# Patient Record
(demographics unavailable — no encounter records)

---

## 2025-02-07 NOTE — HISTORY OF PRESENT ILLNESS
[Pain is well-controlled] : pain is well-controlled [Clean/Dry/Intact] : clean, dry and intact [None] : no vaginal bleeding [Normal] : normal [Pathology reviewed] : pathology reviewed [Fever] : no fever [Chills] : no chills [Nausea] : no nausea [Vomiting] : no vomiting [Diarrhea] : no diarrhea [Vaginal Bleeding] : no vaginal bleeding [Pelvic Pressure] : no pelvic pressure [Dysuria] : no dysuria [Vaginal Discharge] : no vaginal discharge [Erythema] : not erythematous [Swelling] : not swollen [Dehiscence] : not dehisced [Healed] : not healed [Hematoma] : no vaginal hematoma [Abscess Formation] : no vaginal abscess [de-identified] : 14 [de-identified] : TLH, BS, Cysto, Nexplanon Removal [de-identified] : AUB, suspected adenomyosis [de-identified] : 41 yo  POD#14 s/p TLH, BS, Cysto, Nexplanon removal. Patient is doing well. Ambulating, tolerating PO, voiding, pain well controlled, having regular bowel movements.   Path reviewed: benign, consistent with adenomyosis

## 2025-02-07 NOTE — ASSESSMENT
[Doing Well] : is doing well [Excellent Pain Control] : has excellent pain control [No Sign of Infection] : is showing no signs of infection [Regressing] : is regressing [de-identified] : 41 yo  POD#14 s/p TLH, BS, Cysto, Nexplanon removal.

## 2025-02-07 NOTE — HISTORY OF PRESENT ILLNESS
[Pain is well-controlled] : pain is well-controlled [Clean/Dry/Intact] : clean, dry and intact [None] : no vaginal bleeding [Normal] : normal [Pathology reviewed] : pathology reviewed [Fever] : no fever [Chills] : no chills [Nausea] : no nausea [Vomiting] : no vomiting [Diarrhea] : no diarrhea [Vaginal Bleeding] : no vaginal bleeding [Pelvic Pressure] : no pelvic pressure [Dysuria] : no dysuria [Vaginal Discharge] : no vaginal discharge [Erythema] : not erythematous [Swelling] : not swollen [Dehiscence] : not dehisced [Healed] : not healed [Hematoma] : no vaginal hematoma [Abscess Formation] : no vaginal abscess [de-identified] : 14 [de-identified] : TLH, BS, Cysto, Nexplanon Removal [de-identified] : AUB, suspected adenomyosis [de-identified] : 41 yo  POD#14 s/p TLH, BS, Cysto, Nexplanon removal. Patient is doing well. Ambulating, tolerating PO, voiding, pain well controlled, having regular bowel movements.   Path reviewed: benign, consistent with adenomyosis

## 2025-02-07 NOTE — ASSESSMENT
[Doing Well] : is doing well [Excellent Pain Control] : has excellent pain control [No Sign of Infection] : is showing no signs of infection [Regressing] : is regressing [de-identified] : 41 yo  POD#14 s/p TLH, BS, Cysto, Nexplanon removal.

## 2025-03-24 NOTE — PLAN
[FreeTextEntry1] : 41 yo  POD#56 s/p TLH, BS, Cysto, Nexplanon removal () presents for 6 week follow up. Vaginal cuff intact on exam. Pt recovering appropriately.  - Pt recovering appropriately - Advised pt to connect with PCP for management of other health conditions, including diabetes - Return precautions reviewed including vaginal bleeding  RTC PRN  D/w Dr. Jose Cancino PGY2

## 2025-03-24 NOTE — HISTORY OF PRESENT ILLNESS
[FreeTextEntry1] : 43 yo  POD#56 s/p TLH, BS, Cysto, Nexplanon removal () presents for 6 week follow up.   Patient reports she is doing well. Ambulating, tolerating PO, voiding, pain well controlled, having regular bowel movements. Denies vaginal bleeding. Has not had any sexual intercourse x1 year.

## 2025-03-24 NOTE — HISTORY OF PRESENT ILLNESS
[FreeTextEntry1] : 41 yo  POD#56 s/p TLH, BS, Cysto, Nexplanon removal () presents for 6 week follow up.   Patient reports she is doing well. Ambulating, tolerating PO, voiding, pain well controlled, having regular bowel movements. Denies vaginal bleeding. Has not had any sexual intercourse x1 year.

## 2025-03-24 NOTE — PLAN
[FreeTextEntry1] : 43 yo  POD#56 s/p TLH, BS, Cysto, Nexplanon removal () presents for 6 week follow up. Vaginal cuff intact on exam. Pt recovering appropriately.  - Pt recovering appropriately - Advised pt to connect with PCP for management of other health conditions, including diabetes - Return precautions reviewed including vaginal bleeding  RTC PRN  D/w Dr. Jose Cancino PGY2